# Patient Record
Sex: FEMALE
[De-identification: names, ages, dates, MRNs, and addresses within clinical notes are randomized per-mention and may not be internally consistent; named-entity substitution may affect disease eponyms.]

---

## 2021-07-16 PROBLEM — Z00.00 ENCOUNTER FOR PREVENTIVE HEALTH EXAMINATION: Status: ACTIVE | Noted: 2021-07-16

## 2021-07-27 DIAGNOSIS — Z12.39 ENCOUNTER FOR OTHER SCREENING FOR MALIGNANT NEOPLASM OF BREAST: ICD-10-CM

## 2021-07-27 DIAGNOSIS — Z01.419 ENCOUNTER FOR GYNECOLOGICAL EXAMINATION (GENERAL) (ROUTINE) W/OUT ABNORMAL FINDINGS: ICD-10-CM

## 2021-07-28 ENCOUNTER — NON-APPOINTMENT (OUTPATIENT)
Age: 41
End: 2021-07-28

## 2021-07-28 ENCOUNTER — APPOINTMENT (OUTPATIENT)
Dept: OBGYN | Facility: CLINIC | Age: 41
End: 2021-07-28
Payer: COMMERCIAL

## 2021-07-28 VITALS
DIASTOLIC BLOOD PRESSURE: 67 MMHG | BODY MASS INDEX: 25.06 KG/M2 | SYSTOLIC BLOOD PRESSURE: 102 MMHG | WEIGHT: 145 LBS | HEIGHT: 63.78 IN

## 2021-07-28 DIAGNOSIS — Z98.891 HISTORY OF UTERINE SCAR FROM PREVIOUS SURGERY: ICD-10-CM

## 2021-07-28 PROCEDURE — 99386 PREV VISIT NEW AGE 40-64: CPT

## 2021-07-28 NOTE — PHYSICAL EXAM
[Appropriately responsive] : appropriately responsive [Alert] : alert [No Acute Distress] : no acute distress [No Lymphadenopathy] : no lymphadenopathy [No Murmurs] : no murmurs [Soft] : soft [Non-tender] : non-tender [Non-distended] : non-distended [No HSM] : No HSM [No Lesions] : no lesions [No Mass] : no mass [Oriented x3] : oriented x3 [FreeTextEntry7] : healed pfann scar [Examination Of The Breasts] : a normal appearance [No Masses] : no breast masses were palpable [Labia Majora] : normal [Labia Minora] : normal [Normal] : normal [Tenderness] : nontender [Enlarged ___ wks] : not enlarged [Uterine Adnexae] : normal

## 2021-07-28 NOTE — HISTORY OF PRESENT ILLNESS
[Patient reported PAP Smear was normal] : Patient reported PAP Smear was normal [N] : Patient is not sexually active [Y] : Positive pregnancy history [FreeTextEntry1] : doing well; nl menses lasting 7day; q 28d [PapSmeardate] : 8/2020 [LMPDate] : 6/21/2021 [PGHxTotal] : 2 [[facility] : [facility] [[facility]] : 2

## 2021-08-03 LAB — HPV HIGH+LOW RISK DNA PNL CVX: NOT DETECTED

## 2021-09-01 LAB — CYTOLOGY CVX/VAG DOC THIN PREP: NORMAL

## 2021-10-09 ENCOUNTER — TRANSCRIPTION ENCOUNTER (OUTPATIENT)
Age: 41
End: 2021-10-09

## 2022-03-31 ENCOUNTER — APPOINTMENT (OUTPATIENT)
Dept: OBGYN | Facility: CLINIC | Age: 42
End: 2022-03-31
Payer: COMMERCIAL

## 2022-03-31 VITALS
WEIGHT: 138 LBS | BODY MASS INDEX: 24.45 KG/M2 | SYSTOLIC BLOOD PRESSURE: 112 MMHG | DIASTOLIC BLOOD PRESSURE: 77 MMHG | HEIGHT: 63 IN | HEART RATE: 97 BPM

## 2022-03-31 DIAGNOSIS — N94.3 PREMENSTRUAL TENSION SYNDROME: ICD-10-CM

## 2022-03-31 PROCEDURE — 99212 OFFICE O/P EST SF 10 MIN: CPT

## 2022-03-31 NOTE — PLAN
[FreeTextEntry1] : exercise; acupuncture; medical options reviewed\par pt wants medication\par sarafem\par rtn july

## 2022-03-31 NOTE — HISTORY OF PRESENT ILLNESS
[Patient reported PAP Smear was normal] : Patient reported PAP Smear was normal [Patient declined bone density test] : Patient declined bone density test [Patient declined colonoscopy] : Patient declined colonoscopy [N] : Patient is not sexually active [Y] : Positive pregnancy history [FreeTextEntry1] : feeling more hormonal before menses\par comes every 28 days; 5-6 days\par  but not sa currently\par options reviewed with pt\par will try sarafem [Mammogramdate] : 2/2022 [LMPDate] : 03/09/2022 [MensesFreq] : 25 [MensesLength] : 6 [PGHxTotal] : 2 [[facility]xFullTerm] : 2 [PGHxPremature] : 0 [PGHxAbortions] : 0 [[facility]] : [facility] [PGHxABInduced] : 0 [PGHxABSpont] : 0 [PGHxEctopic] : 0 [PGHxMultBirths] : 0

## 2022-04-26 ENCOUNTER — TRANSCRIPTION ENCOUNTER (OUTPATIENT)
Age: 42
End: 2022-04-26

## 2022-07-07 ENCOUNTER — APPOINTMENT (OUTPATIENT)
Dept: OBGYN | Facility: CLINIC | Age: 42
End: 2022-07-07

## 2022-07-07 VITALS
WEIGHT: 143 LBS | HEIGHT: 63 IN | HEART RATE: 73 BPM | SYSTOLIC BLOOD PRESSURE: 109 MMHG | BODY MASS INDEX: 25.34 KG/M2 | DIASTOLIC BLOOD PRESSURE: 71 MMHG

## 2022-07-07 PROCEDURE — 99396 PREV VISIT EST AGE 40-64: CPT

## 2022-07-07 NOTE — HISTORY OF PRESENT ILLNESS
[Patient reported PAP Smear was normal] : Patient reported PAP Smear was normal [N] : Patient does not use contraception [Monogamous (Male Partner)] : is monogamous with a male partner [Y] : Positive pregnancy history [Patient reported mammogram was normal] : Patient reported mammogram was normal [FreeTextEntry1] : doing well;\par sarafem working well\par using condoms\par moving to [place]\par feeling some discomfort in pelvic area occ [Mammogramdate] : 2/2022 [PapSmeardate] : 7/2021 [LMPDate] : 6/23/2022 [[facility]] : 2 [PGHxTotal] : 2 [[facility]] : 2 [Currently Active] : currently active [Men] : men [Condoms] : Condoms

## 2022-07-07 NOTE — PROCEDURE
[Pelvic Pain] : pelvic pain [Transvaginal Ultrasound] : transvaginal ultrasound [FreeTextEntry3] : nl ut; full bladder; nl ovaries b/l

## 2023-08-10 ENCOUNTER — APPOINTMENT (OUTPATIENT)
Dept: OBGYN | Facility: CLINIC | Age: 43
End: 2023-08-10

## 2024-05-20 ENCOUNTER — APPOINTMENT (OUTPATIENT)
Dept: OBGYN | Facility: CLINIC | Age: 44
End: 2024-05-20
Payer: COMMERCIAL

## 2024-05-20 VITALS
HEART RATE: 85 BPM | WEIGHT: 143 LBS | OXYGEN SATURATION: 98 % | SYSTOLIC BLOOD PRESSURE: 131 MMHG | DIASTOLIC BLOOD PRESSURE: 82 MMHG | BODY MASS INDEX: 25.33 KG/M2

## 2024-05-20 DIAGNOSIS — N39.3 STRESS INCONTINENCE (FEMALE) (MALE): ICD-10-CM

## 2024-05-20 PROCEDURE — 99396 PREV VISIT EST AGE 40-64: CPT

## 2024-05-20 RX ORDER — FLUOXETINE 10 MG/1
10 TABLET ORAL DAILY
Qty: 15 | Refills: 10 | Status: ACTIVE | COMMUNITY
Start: 2022-03-31 | End: 1900-01-01

## 2024-05-20 RX ORDER — MULTIVIT-MIN/IRON/FOLIC ACID/K 18-600-40
50 MCG CAPSULE ORAL
Refills: 0 | Status: ACTIVE | COMMUNITY

## 2024-05-20 NOTE — HISTORY OF PRESENT ILLNESS
[Patient reported mammogram was normal] : Patient reported mammogram was normal [FreeTextEntry1] : Felicita Lopez presents for well woman visit with gyn exam.  menses getting shorter; having some pre-menstrual emotional changes. just started vit D pt was given sarafem prescription 2 yrs ago but never started initially declined but then accepted rx today [Mammogramdate] : 3/2024

## 2024-05-24 LAB — HPV HIGH+LOW RISK DNA PNL CVX: NOT DETECTED

## 2024-05-31 ENCOUNTER — NON-APPOINTMENT (OUTPATIENT)
Age: 44
End: 2024-05-31

## 2024-05-31 LAB — CYTOLOGY CVX/VAG DOC THIN PREP: NORMAL

## 2024-10-15 ENCOUNTER — NON-APPOINTMENT (OUTPATIENT)
Age: 44
End: 2024-10-15

## 2025-05-20 NOTE — PLAN
Pain in Back and Knee  Tylenol 2 pills morning and 2 pills night  Flexeril 1 pill up to three times a day - muscle relaxer - for muscle spasm  Diclofenac gel - use on back and legs for pain    Diabetes  Start Januvia 50 mg pill daily - can help with weight and blood sugar   [FreeTextEntry1] : to call if pain worsens;\par no apparent gyn etiolgoy; poss scar tissue\par no pap this year